# Patient Record
Sex: MALE | Race: WHITE | NOT HISPANIC OR LATINO | Employment: OTHER | URBAN - METROPOLITAN AREA
[De-identification: names, ages, dates, MRNs, and addresses within clinical notes are randomized per-mention and may not be internally consistent; named-entity substitution may affect disease eponyms.]

---

## 2023-05-05 LAB
ANION GAP IN SER/PLAS: 10 MMOL/L (ref 10–20)
CALCIUM (MG/DL) IN SER/PLAS: 10 MG/DL (ref 8.6–10.3)
CARBON DIOXIDE, TOTAL (MMOL/L) IN SER/PLAS: 30 MMOL/L (ref 21–32)
CHLORIDE (MMOL/L) IN SER/PLAS: 104 MMOL/L (ref 98–107)
CREATININE (MG/DL) IN SER/PLAS: 1.23 MG/DL (ref 0.5–1.3)
GFR MALE: 61 ML/MIN/1.73M2
GLUCOSE (MG/DL) IN SER/PLAS: 100 MG/DL (ref 74–99)
POTASSIUM (MMOL/L) IN SER/PLAS: 4 MMOL/L (ref 3.5–5.3)
SODIUM (MMOL/L) IN SER/PLAS: 140 MMOL/L (ref 136–145)
UREA NITROGEN (MG/DL) IN SER/PLAS: 16 MG/DL (ref 6–23)

## 2023-06-12 ENCOUNTER — HOSPITAL ENCOUNTER (OUTPATIENT)
Dept: DATA CONVERSION | Facility: HOSPITAL | Age: 78
End: 2023-06-12
Attending: STUDENT IN AN ORGANIZED HEALTH CARE EDUCATION/TRAINING PROGRAM
Payer: MEDICARE

## 2023-06-12 DIAGNOSIS — I70.213 ATHEROSCLEROSIS OF NATIVE ARTERIES OF EXTREMITIES WITH INTERMITTENT CLAUDICATION, BILATERAL LEGS (CMS-HCC): ICD-10-CM

## 2023-06-12 DIAGNOSIS — I70.211 ATHEROSCLEROSIS OF NATIVE ARTERIES OF EXTREMITIES WITH INTERMITTENT CLAUDICATION, RIGHT LEG (CMS-HCC): ICD-10-CM

## 2023-06-12 LAB
ACTIVATED PARTIAL THROMBOPLASTIN TIME IN PPP BY COAGULATION ASSAY: 30 SEC (ref 26–39)
ANION GAP IN SER/PLAS: 12 MMOL/L (ref 10–20)
CALCIUM (MG/DL) IN SER/PLAS: 10.2 MG/DL (ref 8.6–10.3)
CARBON DIOXIDE, TOTAL (MMOL/L) IN SER/PLAS: 27 MMOL/L (ref 21–32)
CHLORIDE (MMOL/L) IN SER/PLAS: 102 MMOL/L (ref 98–107)
CREATININE (MG/DL) IN SER/PLAS: 1.27 MG/DL (ref 0.5–1.3)
ERYTHROCYTE DISTRIBUTION WIDTH (RATIO) BY AUTOMATED COUNT: 12.4 % (ref 11.5–14.5)
ERYTHROCYTE MEAN CORPUSCULAR HEMOGLOBIN CONCENTRATION (G/DL) BY AUTOMATED: 33.4 G/DL (ref 32–36)
ERYTHROCYTE MEAN CORPUSCULAR VOLUME (FL) BY AUTOMATED COUNT: 97 FL (ref 80–100)
ERYTHROCYTES (10*6/UL) IN BLOOD BY AUTOMATED COUNT: 3.96 X10E12/L (ref 4.5–5.9)
GFR MALE: 58 ML/MIN/1.73M2
GLUCOSE (MG/DL) IN SER/PLAS: 108 MG/DL (ref 74–99)
HEMATOCRIT (%) IN BLOOD BY AUTOMATED COUNT: 38.3 % (ref 41–52)
HEMOGLOBIN (G/DL) IN BLOOD: 12.8 G/DL (ref 13.5–17.5)
INR IN PPP BY COAGULATION ASSAY: 1.1 (ref 0.9–1.1)
LEUKOCYTES (10*3/UL) IN BLOOD BY AUTOMATED COUNT: 7.3 X10E9/L (ref 4.4–11.3)
NRBC (PER 100 WBCS) BY AUTOMATED COUNT: 0 /100 WBC (ref 0–0)
PLATELETS (10*3/UL) IN BLOOD AUTOMATED COUNT: 183 X10E9/L (ref 150–450)
POTASSIUM (MMOL/L) IN SER/PLAS: 3.7 MMOL/L (ref 3.5–5.3)
PROTHROMBIN TIME (PT) IN PPP BY COAGULATION ASSAY: 12.5 SEC (ref 9.8–13.4)
SODIUM (MMOL/L) IN SER/PLAS: 137 MMOL/L (ref 136–145)
UREA NITROGEN (MG/DL) IN SER/PLAS: 19 MG/DL (ref 6–23)

## 2023-09-02 PROBLEM — I25.118 CORONARY ARTERY DISEASE WITH EXERTIONAL ANGINA (CMS-HCC): Status: ACTIVE | Noted: 2023-09-02

## 2023-09-02 PROBLEM — I10 HTN (HYPERTENSION): Status: ACTIVE | Noted: 2023-09-02

## 2023-09-02 PROBLEM — I70.213 INTERMITTENT CLAUDICATION OF BOTH LOWER EXTREMITIES DUE TO ATHEROSCLEROSIS (CMS-HCC): Status: ACTIVE | Noted: 2023-09-02

## 2023-09-02 PROBLEM — Z95.1 H/O FOUR VESSEL CORONARY ARTERY BYPASS GRAFT: Status: ACTIVE | Noted: 2023-09-02

## 2023-09-02 PROBLEM — E78.5 HYPERLIPIDEMIA: Status: ACTIVE | Noted: 2023-09-02

## 2023-09-02 PROBLEM — I20.89 CHRONIC STABLE ANGINA (CMS-HCC): Status: ACTIVE | Noted: 2023-09-02

## 2023-09-02 PROBLEM — I35.0 MODERATE AORTIC STENOSIS: Status: ACTIVE | Noted: 2023-09-02

## 2023-09-02 RX ORDER — AMLODIPINE BESYLATE 10 MG/1
1 TABLET ORAL DAILY
COMMUNITY
End: 2024-05-01 | Stop reason: DRUGHIGH

## 2023-09-02 RX ORDER — LOSARTAN POTASSIUM AND HYDROCHLOROTHIAZIDE 12.5; 1 MG/1; MG/1
1 TABLET ORAL DAILY
COMMUNITY
End: 2024-03-13

## 2023-09-02 RX ORDER — AMLODIPINE BESYLATE 5 MG/1
TABLET ORAL
COMMUNITY
Start: 2023-04-26 | End: 2024-03-13

## 2023-09-02 RX ORDER — METOPROLOL SUCCINATE 25 MG/1
0.5 TABLET, EXTENDED RELEASE ORAL DAILY
COMMUNITY
Start: 2021-04-06 | End: 2024-03-13

## 2023-09-02 RX ORDER — PRASUGREL 10 MG/1
1 TABLET, FILM COATED ORAL DAILY
COMMUNITY
End: 2024-03-13

## 2023-09-02 RX ORDER — NITROGLYCERIN 0.4 MG/1
0.4 TABLET SUBLINGUAL EVERY 5 MIN PRN
COMMUNITY

## 2023-09-02 RX ORDER — ATORVASTATIN CALCIUM 40 MG/1
1 TABLET, FILM COATED ORAL DAILY
COMMUNITY
End: 2024-03-13

## 2023-09-03 PROBLEM — I73.9 PAD (PERIPHERAL ARTERY DISEASE) (CMS-HCC): Status: ACTIVE | Noted: 2023-09-03

## 2023-09-03 RX ORDER — ASPIRIN 81 MG/1
81 TABLET ORAL DAILY
COMMUNITY

## 2023-09-30 NOTE — H&P
History & Physical Reviewed:   I have reviewed the History and Physical dated:  17-May-2300   History and Physical reviewed and relevant findings noted. Patient examined to review pertinent physical  findings.: No significant changes   Home Medications Reviewed: no changes noted   Allergies Reviewed: no changes noted       Airway/Sedation Assessment:  ·  Emotional Status calm   ·  Neurologic alert & oriented x 3   ·  Respiratory clear to auscultation   ·  Cardiovascular rhythm & rate regular   ·  GI/ soft, nontender     · Pulses present: Pedal Left, Pedal Right     ·  Mouth Opening OK yes   ·  Neck Flexibility OK yes   ·  Loose Teeth no   ·  Oropharyngeal Classification Class II   ·  ASA PS Classification ASA II   ·  Sedation Plan moderate sedation       ERAS (Enhanced Recovery After Surgery):  ·  ERAS Patient: no     Consent:   COVID-19 Consent:  ·  COVID-19 Risk Consent Surgeon has reviewed key risks related to the risk of armando COVID-19 and if they contract COVID-19 what the risks are.       Electronic Signatures:  Nomi Acosta)  (Signed 12-Jun-2023 13:56)   Authored: History & Physical Reviewed, Airway/Sedation,  ERAS, Consent, Note Completion      Last Updated: 12-Jun-2023 13:56 by Nomi Acosta)

## 2023-10-06 ENCOUNTER — APPOINTMENT (OUTPATIENT)
Dept: VASCULAR MEDICINE | Facility: HOSPITAL | Age: 78
End: 2023-10-06
Payer: MEDICARE

## 2023-10-06 ENCOUNTER — HOSPITAL ENCOUNTER (OUTPATIENT)
Dept: VASCULAR MEDICINE | Facility: HOSPITAL | Age: 78
Discharge: HOME | End: 2023-10-06
Payer: MEDICARE

## 2023-10-06 DIAGNOSIS — I73.9 PERIPHERAL VASCULAR DISEASE, UNSPECIFIED (CMS-HCC): ICD-10-CM

## 2023-10-06 DIAGNOSIS — I70.213 ATHEROSCLEROSIS OF NATIVE ARTERIES OF EXTREMITIES WITH INTERMITTENT CLAUDICATION, BILATERAL LEGS (CMS-HCC): ICD-10-CM

## 2023-10-06 DIAGNOSIS — Z09 ENCOUNTER FOR FOLLOW-UP EXAMINATION AFTER COMPLETED TREATMENT FOR CONDITIONS OTHER THAN MALIGNANT NEOPLASM: ICD-10-CM

## 2023-10-06 PROCEDURE — 93922 UPR/L XTREMITY ART 2 LEVELS: CPT

## 2023-10-06 PROCEDURE — 93922 UPR/L XTREMITY ART 2 LEVELS: CPT | Performed by: INTERNAL MEDICINE

## 2023-10-10 ENCOUNTER — APPOINTMENT (OUTPATIENT)
Dept: CARDIOLOGY | Facility: CLINIC | Age: 78
End: 2023-10-10
Payer: MEDICARE

## 2023-10-10 ENCOUNTER — APPOINTMENT (OUTPATIENT)
Dept: VASCULAR MEDICINE | Facility: CLINIC | Age: 78
End: 2023-10-10
Payer: MEDICARE

## 2023-10-17 ENCOUNTER — APPOINTMENT (OUTPATIENT)
Dept: VASCULAR SURGERY | Facility: CLINIC | Age: 78
End: 2023-10-17
Payer: MEDICARE

## 2023-10-24 ENCOUNTER — APPOINTMENT (OUTPATIENT)
Dept: VASCULAR SURGERY | Facility: CLINIC | Age: 78
End: 2023-10-24
Payer: MEDICARE

## 2024-03-13 DIAGNOSIS — I25.118 CORONARY ARTERY DISEASE WITH EXERTIONAL ANGINA (CMS-HCC): Primary | ICD-10-CM

## 2024-03-13 DIAGNOSIS — I10 PRIMARY HYPERTENSION: ICD-10-CM

## 2024-03-13 DIAGNOSIS — E78.5 HYPERLIPIDEMIA, UNSPECIFIED HYPERLIPIDEMIA TYPE: ICD-10-CM

## 2024-03-13 RX ORDER — AMLODIPINE BESYLATE 5 MG/1
5 TABLET ORAL DAILY
Qty: 90 TABLET | Refills: 3 | Status: SHIPPED | OUTPATIENT
Start: 2024-03-13 | End: 2024-05-01 | Stop reason: SDUPTHER

## 2024-03-13 RX ORDER — METOPROLOL SUCCINATE 25 MG/1
TABLET, EXTENDED RELEASE ORAL
Qty: 45 TABLET | Refills: 3 | Status: SHIPPED | OUTPATIENT
Start: 2024-03-13 | End: 2024-05-01 | Stop reason: SDUPTHER

## 2024-03-13 RX ORDER — ATORVASTATIN CALCIUM 40 MG/1
40 TABLET, FILM COATED ORAL DAILY
Qty: 90 TABLET | Refills: 3 | Status: SHIPPED | OUTPATIENT
Start: 2024-03-13 | End: 2024-05-01 | Stop reason: SDUPTHER

## 2024-03-13 RX ORDER — LOSARTAN POTASSIUM AND HYDROCHLOROTHIAZIDE 12.5; 1 MG/1; MG/1
1 TABLET ORAL DAILY
Qty: 90 TABLET | Refills: 3 | Status: SHIPPED | OUTPATIENT
Start: 2024-03-13 | End: 2024-05-01 | Stop reason: SDUPTHER

## 2024-03-13 RX ORDER — PRASUGREL 10 MG/1
10 TABLET, FILM COATED ORAL DAILY
Qty: 90 TABLET | Refills: 3 | Status: SHIPPED | OUTPATIENT
Start: 2024-03-13 | End: 2024-05-01 | Stop reason: SDUPTHER

## 2024-05-01 ENCOUNTER — HOSPITAL ENCOUNTER (OUTPATIENT)
Dept: CARDIOLOGY | Facility: CLINIC | Age: 79
Discharge: HOME | End: 2024-05-01
Payer: MEDICARE

## 2024-05-01 ENCOUNTER — OFFICE VISIT (OUTPATIENT)
Dept: CARDIOLOGY | Facility: CLINIC | Age: 79
End: 2024-05-01
Payer: MEDICARE

## 2024-05-01 VITALS
WEIGHT: 155 LBS | OXYGEN SATURATION: 97 % | SYSTOLIC BLOOD PRESSURE: 111 MMHG | HEART RATE: 65 BPM | BODY MASS INDEX: 25.02 KG/M2 | DIASTOLIC BLOOD PRESSURE: 61 MMHG

## 2024-05-01 DIAGNOSIS — I35.0 MODERATE AORTIC STENOSIS: Primary | ICD-10-CM

## 2024-05-01 DIAGNOSIS — I35.0 MODERATE AORTIC STENOSIS: ICD-10-CM

## 2024-05-01 DIAGNOSIS — Z01.810 PREOPERATIVE CARDIOVASCULAR EXAMINATION: ICD-10-CM

## 2024-05-01 DIAGNOSIS — I25.118 CORONARY ARTERY DISEASE WITH EXERTIONAL ANGINA (CMS-HCC): ICD-10-CM

## 2024-05-01 DIAGNOSIS — Z95.1 H/O FOUR VESSEL CORONARY ARTERY BYPASS GRAFT: ICD-10-CM

## 2024-05-01 DIAGNOSIS — E78.5 HYPERLIPIDEMIA, UNSPECIFIED HYPERLIPIDEMIA TYPE: ICD-10-CM

## 2024-05-01 DIAGNOSIS — I10 PRIMARY HYPERTENSION: ICD-10-CM

## 2024-05-01 DIAGNOSIS — I70.213 INTERMITTENT CLAUDICATION OF BOTH LOWER EXTREMITIES DUE TO ATHEROSCLEROSIS (CMS-HCC): ICD-10-CM

## 2024-05-01 PROBLEM — I20.89 CHRONIC STABLE ANGINA (CMS-HCC): Status: RESOLVED | Noted: 2023-09-02 | Resolved: 2024-05-01

## 2024-05-01 PROCEDURE — 93306 TTE W/DOPPLER COMPLETE: CPT | Performed by: INTERNAL MEDICINE

## 2024-05-01 PROCEDURE — 1036F TOBACCO NON-USER: CPT | Performed by: INTERNAL MEDICINE

## 2024-05-01 PROCEDURE — 93000 ELECTROCARDIOGRAM COMPLETE: CPT | Performed by: INTERNAL MEDICINE

## 2024-05-01 PROCEDURE — 3074F SYST BP LT 130 MM HG: CPT | Performed by: INTERNAL MEDICINE

## 2024-05-01 PROCEDURE — 1159F MED LIST DOCD IN RCRD: CPT | Performed by: INTERNAL MEDICINE

## 2024-05-01 PROCEDURE — 93306 TTE W/DOPPLER COMPLETE: CPT

## 2024-05-01 PROCEDURE — 1157F ADVNC CARE PLAN IN RCRD: CPT | Performed by: INTERNAL MEDICINE

## 2024-05-01 PROCEDURE — 3078F DIAST BP <80 MM HG: CPT | Performed by: INTERNAL MEDICINE

## 2024-05-01 PROCEDURE — 99214 OFFICE O/P EST MOD 30 MIN: CPT | Performed by: INTERNAL MEDICINE

## 2024-05-01 RX ORDER — METOPROLOL SUCCINATE 25 MG/1
TABLET, EXTENDED RELEASE ORAL
Qty: 45 TABLET | Refills: 3 | Status: SHIPPED | OUTPATIENT
Start: 2024-05-01 | End: 2024-05-03 | Stop reason: SDUPTHER

## 2024-05-01 RX ORDER — ATORVASTATIN CALCIUM 40 MG/1
40 TABLET, FILM COATED ORAL DAILY
Qty: 90 TABLET | Refills: 3 | Status: SHIPPED | OUTPATIENT
Start: 2024-05-01 | End: 2024-05-03 | Stop reason: SDUPTHER

## 2024-05-01 RX ORDER — AMLODIPINE BESYLATE 5 MG/1
5 TABLET ORAL DAILY
Qty: 90 TABLET | Refills: 3 | Status: SHIPPED | OUTPATIENT
Start: 2024-05-01 | End: 2024-05-03 | Stop reason: SDUPTHER

## 2024-05-01 RX ORDER — PRASUGREL 10 MG/1
10 TABLET, FILM COATED ORAL DAILY
Qty: 90 TABLET | Refills: 3 | Status: SHIPPED | OUTPATIENT
Start: 2024-05-01 | End: 2024-05-03 | Stop reason: SDUPTHER

## 2024-05-01 RX ORDER — LOSARTAN POTASSIUM AND HYDROCHLOROTHIAZIDE 12.5; 1 MG/1; MG/1
1 TABLET ORAL DAILY
Qty: 90 TABLET | Refills: 3 | Status: SHIPPED | OUTPATIENT
Start: 2024-05-01 | End: 2024-05-03 | Stop reason: SDUPTHER

## 2024-05-01 ASSESSMENT — ENCOUNTER SYMPTOMS
DYSPNEA ON EXERTION: 1
CLAUDICATION: 1

## 2024-05-01 NOTE — PROGRESS NOTES
Subjective   Anton Miranda is a 78 y.o. male.    Chief Complaint:  Aortic stenosis.  Preoperative evaluation prior to vascular surgery.  Claudication.  Fatigue.  Exertional dyspnea.    HPI    He is here for preoperative evaluation prior to vascular surgery.  He has significant claudication symptoms in his right lower extremity.  Prior vascular studies have demonstrated severe diffuse disease in the right femoral and superficial femoral artery.  He notices more fatigue and generalized weakness.  Denies anginal symptoms.  No syncope or near syncope.  He spends the winter in [place].  He tried to do a lot of walking in [place] about was limited because of claudication.    He presented on May 4, 2022 with severe hypertension. This appeared to be triggered by an anxiety attack. After initiation of Xanax therapy his blood pressures normalized.     Cardiac catheterization on 2021 showed an occluded left main. There was a patent left internal mammary graft to the anterior descending. There was a patent vein graft to the diagonal. There was a tight stenosis in the vein graft to the obtuse marginal branch. This was treated with stent placement. The right coronary artery was widely patent with minimal disease.     He underwent coronary artery bypass grafting times 4 in  at [facility]. In  he had stents placed and subsequently developed restenosis along with left main coronary artery disease. It is of interest to note that despite his 80% left main stenosis he had mild symptoms.      He has a past history of hypertension. He had a brief smoking history but quit in . His father had a myocardial infarction and  at age 66. He does have hyperlipidemia.     Is otherwise in good health with no major medical problems.      Allergies  Medication    · No Known Drug Allergies   Recorded By: Bela Collins; 2019 1:52:40 PM     Family History  Mother    · Family history of coronary artery disease (V17.3)  (Z82.49)   · Family history of myocardial infarction (V17.3) (Z82.49)     Social History  Problems    · Does not use illicit drugs (V49.89) (Z78.9)   · Former smoker (V15.82) (Z87.891)   · quit 1980   · Moderate alcohol use    Review of Systems   Constitutional: Positive for malaise/fatigue.   Cardiovascular:  Positive for claudication and dyspnea on exertion.   Musculoskeletal:  Positive for arthritis.       Visit Vitals  /61 (BP Location: Right arm, Patient Position: Sitting, BP Cuff Size: Adult)   Pulse 65   Wt 70.3 kg (155 lb)   SpO2 97%   BMI 25.02 kg/m²   Smoking Status Former   BSA 1.81 m²        Objective     Constitutional:       Appearance: Not in distress.   Neck:      Vascular: JVD normal.   Pulmonary:      Breath sounds: Normal breath sounds.   Cardiovascular:      Normal rate. Regular rhythm. S1 with normal intensity. S2 with normal intensity.       Murmurs: There is a grade 3/6 systolic murmur.      No gallop.    Pulses:     Intact distal pulses.   Edema:     Peripheral edema absent.   Abdominal:      General: Bowel sounds are normal.   Neurological:      Mental Status: Alert and oriented to person, place and time.         Lab Review:   Lab Results   Component Value Date     06/12/2023    K 3.7 06/12/2023     06/12/2023    CO2 27 06/12/2023    BUN 19 06/12/2023    CREATININE 1.27 06/12/2023    GLUCOSE 108 (H) 06/12/2023    CALCIUM 10.2 06/12/2023     Lab Results   Component Value Date    CHOL 167 04/29/2022    TRIG 174 (H) 04/29/2022    HDL 58.0 04/29/2022       Assessment:    1.  Coronary artery disease.  Status post coronary bypass grafting in 2000.  Latest cardiac catheterization in 2021 showed a patent left internal mammary graft to the anterior descending, patent vein graft to the diagonal, and a vein graft to the obtuse marginal branch of the circumflex coronary artery.  His right coronary artery had mild disease.  He has had no anginal symptoms.  Continue medical management.   Today's echocardiographic study shows no wall motion abnormalities with a normal left ventricular ejection fraction.    2.  Aortic stenosis.  Today's echocardiographic study shows moderate aortic stenosis.  The valve appears trileaflet.  The parameters are unchanged from his previous echo 1 year ago.  No indication for intervention on his aortic valve at this time.    3.  Claudication.  Followed by the vascular surgery service.    4.  Hyperlipidemia.  Most recent LDL is 74.    5.  Preoperative evaluation.  Cleared for the operative procedure with acceptable cardiac risk.  Anticoagulation can be stopped 7 days prior to the procedure.

## 2024-05-01 NOTE — PATIENT INSTRUCTIONS
Your echocardiographic study shows no change in the aortic valve.  It is moderately narrowed.    We are going to give the okay for your surgical procedure.

## 2024-05-02 LAB
AORTIC VALVE MEAN GRADIENT: 22.2 MMHG
AORTIC VALVE PEAK VELOCITY: 3.26 M/S
AV PEAK GRADIENT: 42.6 MMHG
AVA (PEAK VEL): 0.87 CM2
AVA (VTI): 0.88 CM2
EJECTION FRACTION APICAL 4 CHAMBER: 48.3
LEFT VENTRICLE INTERNAL DIMENSION DIASTOLE: 4.79 CM (ref 3.5–6)
LEFT VENTRICULAR OUTFLOW TRACT DIAMETER: 1.98 CM
LV EJECTION FRACTION BIPLANE: 51 %
RIGHT VENTRICLE FREE WALL PEAK S': 0.11 CM/S
RIGHT VENTRICLE PEAK SYSTOLIC PRESSURE: 18 MMHG
TRICUSPID ANNULAR PLANE SYSTOLIC EXCURSION: 2.2 CM

## 2024-05-03 ENCOUNTER — TELEPHONE (OUTPATIENT)
Dept: CARDIOLOGY | Facility: CLINIC | Age: 79
End: 2024-05-03
Payer: MEDICARE

## 2024-05-03 DIAGNOSIS — E78.5 HYPERLIPIDEMIA, UNSPECIFIED HYPERLIPIDEMIA TYPE: ICD-10-CM

## 2024-05-03 DIAGNOSIS — I25.118 CORONARY ARTERY DISEASE WITH EXERTIONAL ANGINA (CMS-HCC): ICD-10-CM

## 2024-05-03 DIAGNOSIS — I10 PRIMARY HYPERTENSION: ICD-10-CM

## 2024-05-03 RX ORDER — METOPROLOL SUCCINATE 25 MG/1
TABLET, EXTENDED RELEASE ORAL
Qty: 45 TABLET | Refills: 3 | Status: SHIPPED | OUTPATIENT
Start: 2024-05-03

## 2024-05-03 RX ORDER — LOSARTAN POTASSIUM AND HYDROCHLOROTHIAZIDE 12.5; 1 MG/1; MG/1
1 TABLET ORAL DAILY
Qty: 90 TABLET | Refills: 3 | Status: SHIPPED | OUTPATIENT
Start: 2024-05-03

## 2024-05-03 RX ORDER — PRASUGREL 10 MG/1
10 TABLET, FILM COATED ORAL DAILY
Qty: 90 TABLET | Refills: 3 | Status: SHIPPED | OUTPATIENT
Start: 2024-05-03

## 2024-05-03 RX ORDER — ATORVASTATIN CALCIUM 40 MG/1
40 TABLET, FILM COATED ORAL DAILY
Qty: 90 TABLET | Refills: 3 | Status: SHIPPED | OUTPATIENT
Start: 2024-05-03

## 2024-05-03 RX ORDER — AMLODIPINE BESYLATE 5 MG/1
5 TABLET ORAL DAILY
Qty: 90 TABLET | Refills: 3 | Status: SHIPPED | OUTPATIENT
Start: 2024-05-03

## 2024-05-03 NOTE — TELEPHONE ENCOUNTER
Patient stopped into the office today because his prescriptions were sent to [facility] instead of [facility].  Please resubmit his medications to [facility] and call with any questions or concerns, thank you.

## 2024-05-06 ENCOUNTER — LAB (OUTPATIENT)
Dept: LAB | Facility: LAB | Age: 79
End: 2024-05-06
Payer: MEDICARE

## 2024-05-06 DIAGNOSIS — I10 PRIMARY HYPERTENSION: ICD-10-CM

## 2024-05-06 DIAGNOSIS — I70.213 INTERMITTENT CLAUDICATION OF BOTH LOWER EXTREMITIES DUE TO ATHEROSCLEROSIS (CMS-HCC): ICD-10-CM

## 2024-05-06 DIAGNOSIS — I35.0 MODERATE AORTIC STENOSIS: ICD-10-CM

## 2024-05-06 DIAGNOSIS — I25.118 CORONARY ARTERY DISEASE WITH EXERTIONAL ANGINA (CMS-HCC): ICD-10-CM

## 2024-05-06 DIAGNOSIS — E78.5 HYPERLIPIDEMIA, UNSPECIFIED HYPERLIPIDEMIA TYPE: ICD-10-CM

## 2024-05-06 LAB
ALBUMIN SERPL BCP-MCNC: 3.9 G/DL (ref 3.4–5)
ALP SERPL-CCNC: 50 U/L (ref 33–136)
ALT SERPL W P-5'-P-CCNC: 18 U/L (ref 10–52)
ANION GAP SERPL CALC-SCNC: 8 MMOL/L (ref 10–20)
AST SERPL W P-5'-P-CCNC: 21 U/L (ref 9–39)
BILIRUB SERPL-MCNC: 0.7 MG/DL (ref 0–1.2)
BUN SERPL-MCNC: 15 MG/DL (ref 6–23)
CALCIUM SERPL-MCNC: 9.4 MG/DL (ref 8.6–10.3)
CHLORIDE SERPL-SCNC: 106 MMOL/L (ref 98–107)
CHOLEST SERPL-MCNC: 147 MG/DL (ref 0–199)
CHOLESTEROL/HDL RATIO: 2.6
CO2 SERPL-SCNC: 30 MMOL/L (ref 21–32)
CREAT SERPL-MCNC: 1.15 MG/DL (ref 0.5–1.3)
EGFRCR SERPLBLD CKD-EPI 2021: 66 ML/MIN/1.73M*2
ERYTHROCYTE [DISTWIDTH] IN BLOOD BY AUTOMATED COUNT: 12.8 % (ref 11.5–14.5)
GLUCOSE SERPL-MCNC: 98 MG/DL (ref 74–99)
HCT VFR BLD AUTO: 37.1 % (ref 41–52)
HDLC SERPL-MCNC: 55.7 MG/DL
HGB BLD-MCNC: 12.8 G/DL (ref 13.5–17.5)
LDLC SERPL CALC-MCNC: 74 MG/DL
MCH RBC QN AUTO: 32.7 PG (ref 26–34)
MCHC RBC AUTO-ENTMCNC: 34.5 G/DL (ref 32–36)
MCV RBC AUTO: 95 FL (ref 80–100)
NON HDL CHOLESTEROL: 91 MG/DL (ref 0–149)
NRBC BLD-RTO: 0 /100 WBCS (ref 0–0)
PLATELET # BLD AUTO: 175 X10*3/UL (ref 150–450)
POTASSIUM SERPL-SCNC: 4.1 MMOL/L (ref 3.5–5.3)
PROT SERPL-MCNC: 6.6 G/DL (ref 6.4–8.2)
RBC # BLD AUTO: 3.91 X10*6/UL (ref 4.5–5.9)
SODIUM SERPL-SCNC: 140 MMOL/L (ref 136–145)
TRIGL SERPL-MCNC: 89 MG/DL (ref 0–149)
VLDL: 18 MG/DL (ref 0–40)
WBC # BLD AUTO: 5.1 X10*3/UL (ref 4.4–11.3)

## 2024-05-06 PROCEDURE — 80061 LIPID PANEL: CPT

## 2024-05-06 PROCEDURE — 36415 COLL VENOUS BLD VENIPUNCTURE: CPT

## 2024-05-06 PROCEDURE — 80053 COMPREHEN METABOLIC PANEL: CPT

## 2024-05-06 PROCEDURE — 85027 COMPLETE CBC AUTOMATED: CPT

## 2024-05-07 ENCOUNTER — OFFICE VISIT (OUTPATIENT)
Dept: VASCULAR SURGERY | Facility: CLINIC | Age: 79
End: 2024-05-07
Payer: MEDICARE

## 2024-05-07 VITALS
HEART RATE: 64 BPM | DIASTOLIC BLOOD PRESSURE: 64 MMHG | BODY MASS INDEX: 25.07 KG/M2 | SYSTOLIC BLOOD PRESSURE: 122 MMHG | HEIGHT: 66 IN | OXYGEN SATURATION: 96 % | WEIGHT: 156 LBS

## 2024-05-07 DIAGNOSIS — I99.8 OTHER DISORDER OF CIRCULATORY SYSTEM: ICD-10-CM

## 2024-05-07 DIAGNOSIS — I73.9 PAD (PERIPHERAL ARTERY DISEASE) (CMS-HCC): Primary | ICD-10-CM

## 2024-05-07 DIAGNOSIS — I70.211 INTERMITTENT CLAUDICATION OF RIGHT LOWER EXTREMITY DUE TO ATHEROSCLEROSIS (CMS-HCC): ICD-10-CM

## 2024-05-07 PROCEDURE — 1157F ADVNC CARE PLAN IN RCRD: CPT | Performed by: SURGERY

## 2024-05-07 PROCEDURE — 1159F MED LIST DOCD IN RCRD: CPT | Performed by: SURGERY

## 2024-05-07 PROCEDURE — 99213 OFFICE O/P EST LOW 20 MIN: CPT | Performed by: SURGERY

## 2024-05-07 PROCEDURE — 3074F SYST BP LT 130 MM HG: CPT | Performed by: SURGERY

## 2024-05-07 PROCEDURE — 1160F RVW MEDS BY RX/DR IN RCRD: CPT | Performed by: SURGERY

## 2024-05-07 PROCEDURE — 1036F TOBACCO NON-USER: CPT | Performed by: SURGERY

## 2024-05-07 PROCEDURE — 3078F DIAST BP <80 MM HG: CPT | Performed by: SURGERY

## 2024-05-07 RX ORDER — SODIUM CHLORIDE 9 MG/ML
100 INJECTION, SOLUTION INTRAVENOUS CONTINUOUS
Status: CANCELLED | OUTPATIENT
Start: 2024-05-07

## 2024-05-07 ASSESSMENT — ENCOUNTER SYMPTOMS
CARDIOVASCULAR NEGATIVE: 1
HEMATOLOGIC/LYMPHATIC NEGATIVE: 1
ALLERGIC/IMMUNOLOGIC NEGATIVE: 1
NEUROLOGICAL NEGATIVE: 1
GASTROINTESTINAL NEGATIVE: 1
PSYCHIATRIC NEGATIVE: 1
CONSTITUTIONAL NEGATIVE: 1
MUSCULOSKELETAL NEGATIVE: 1
RESPIRATORY NEGATIVE: 1
ENDOCRINE NEGATIVE: 1

## 2024-05-07 NOTE — PROGRESS NOTES
History Of Present Illness  Anton Miranda is a 78 y.o. male presenting for PAD follow-up.  He states that his right leg claudication symptoms at rest since his last visit.  He feels that is now beginning to limit his daily activities.  The last time he was seen surgical discussion was had regarding right femoral endarterectomy with SFA intervention.  He felt his symptoms were tolerable at this time.  However since his symptoms have progressed he is again interested in having surgery he denies any rest pain.  He denies any sores or ulcers.     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    Surgical History  He has a past surgical history that includes Other surgical history (02/06/2019); Other surgical history (02/06/2019); Other surgical history (02/06/2019); Other surgical history (05/17/2021); and CT angio aorta and bilateral iliofemoral runoff w and or wo IV contrast (5/8/2023).     Social History  He reports that he has quit smoking. His smoking use included cigarettes. He has never used smokeless tobacco. No history on file for alcohol use and drug use.    Family History  Family History   Problem Relation Name Age of Onset    Coronary artery disease Mother      Heart attack Mother          Allergies  Patient has no known allergies.    Review of Systems   Constitutional: Negative.    HENT: Negative.     Respiratory: Negative.     Cardiovascular: Negative.    Gastrointestinal: Negative.    Endocrine: Negative.   "  Genitourinary: Negative.    Musculoskeletal: Negative.    Allergic/Immunologic: Negative.    Neurological: Negative.    Hematological: Negative.    Psychiatric/Behavioral: Negative.          Physical Exam  Vitals reviewed.   Constitutional:       General: He is not in acute distress.     Appearance: Normal appearance. He is normal weight.   HENT:      Head: Normocephalic and atraumatic.   Eyes:      Extraocular Movements: Extraocular movements intact.      Conjunctiva/sclera: Conjunctivae normal.   Cardiovascular:      Rate and Rhythm: Normal rate and regular rhythm.      Pulses:           Femoral pulses are 2+ on the right side and 2+ on the left side.       Dorsalis pedis pulses are detected w/ Doppler on the right side and 1+ on the left side.        Posterior tibial pulses are detected w/ Doppler on the right side and 1+ on the left side.      Heart sounds: Normal heart sounds.   Pulmonary:      Effort: Pulmonary effort is normal.      Breath sounds: Normal breath sounds.   Abdominal:      General: Abdomen is flat.      Palpations: Abdomen is soft.   Musculoskeletal:         General: No swelling or tenderness. Normal range of motion.      Cervical back: Normal range of motion.      Right lower leg: No edema.      Left lower leg: No edema.   Skin:     General: Skin is warm and dry.      Capillary Refill: Capillary refill takes less than 2 seconds.   Neurological:      General: No focal deficit present.      Mental Status: He is alert and oriented to person, place, and time.      Cranial Nerves: No cranial nerve deficit.      Sensory: No sensory deficit.      Motor: No weakness.   Psychiatric:         Mood and Affect: Mood normal.         Behavior: Behavior normal.          Last Recorded Vitals  Blood pressure 122/64, pulse 64, height 1.676 m (5' 6\"), weight 70.8 kg (156 lb), SpO2 96%.    Relevant Results      Current Outpatient Medications:     amLODIPine (Norvasc) 5 mg tablet, Take 1 tablet (5 mg) by mouth " once daily., Disp: 90 tablet, Rfl: 3    aspirin 81 mg EC tablet, Take 1 tablet (81 mg) by mouth once daily., Disp: , Rfl:     atorvastatin (Lipitor) 40 mg tablet, Take 1 tablet (40 mg) by mouth once daily., Disp: 90 tablet, Rfl: 3    losartan-hydrochlorothiazide (Hyzaar) 100-12.5 mg tablet, Take 1 tablet by mouth once daily., Disp: 90 tablet, Rfl: 3    metoprolol succinate XL (Toprol-XL) 25 mg 24 hr tablet, Take 1/2 Tablet (12.5 mg) Daily, Disp: 45 tablet, Rfl: 3    nitroglycerin (Nitrostat) 0.4 mg SL tablet, Place 1 tablet (0.4 mg) under the tongue every 5 minutes if needed. Up to 3 doses as needed for chest pain, Disp: , Rfl:     prasugrel (Effient) 10 mg tablet, Take 1 tablet (10 mg) by mouth once daily., Disp: 90 tablet, Rfl: 3          Assessment/Plan   Diagnoses and all orders for this visit:  PAD (peripheral artery disease) (CMS-HCC)  -     Coagulation Screen; Future  -     Case Request Operating Room: Lower Extremity Angiogram; Standing  -     CBC; Future  -     Basic Metabolic Panel; Future  Intermittent claudication of right lower extremity due to atherosclerosis (CMS-HCC)  -     Coagulation Screen; Future  -     Case Request Operating Room: Lower Extremity Angiogram; Standing  -     CBC; Future  -     Basic Metabolic Panel; Future  Other disorder of circulatory system  -     Coagulation Screen; Future  Other orders  -     Vital Signs; Standing  -     Pulse oximetry, spot; Standing  -     Insert and maintain peripheral IV; Standing  -     Saline lock IV; Standing  -     sodium chloride 0.9% infusion  -     Place in outpatient/hospital ambulatory surgery; Standing  -     Full code; Standing      79yo male with PAD.  His claudication symptoms have worsened since his last visit 8 months ago.  We had previously discussed intervention with right femoral endarterectomy and SFA intervention.  However this was based on angiogram from almost a year ago.  Therefore I have recommended repeating an angiogram at this  time to evaluate for any progression of disease and determine the best intervention at this time.  I have discussed this with the patient and he would like to proceed.  Will schedule in [facility] with moderate sedation.       I spent 25 minutes in the professional and overall care of this patient.      Tali Lowry MD

## 2024-05-07 NOTE — H&P (VIEW-ONLY)
History Of Present Illness  Anton Miranda is a 78 y.o. male presenting for PAD follow-up.  He states that his right leg claudication symptoms at rest since his last visit.  He feels that is now beginning to limit his daily activities.  The last time he was seen surgical discussion was had regarding right femoral endarterectomy with SFA intervention.  He felt his symptoms were tolerable at this time.  However since his symptoms have progressed he is again interested in having surgery he denies any rest pain.  He denies any sores or ulcers.     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    Surgical History  He has a past surgical history that includes Other surgical history (02/06/2019); Other surgical history (02/06/2019); Other surgical history (02/06/2019); Other surgical history (05/17/2021); and CT angio aorta and bilateral iliofemoral runoff w and or wo IV contrast (5/8/2023).     Social History  He reports that he has quit smoking. His smoking use included cigarettes. He has never used smokeless tobacco. No history on file for alcohol use and drug use.    Family History  Family History   Problem Relation Name Age of Onset    Coronary artery disease Mother      Heart attack Mother          Allergies  Patient has no known allergies.    Review of Systems   Constitutional: Negative.    HENT: Negative.     Respiratory: Negative.     Cardiovascular: Negative.    Gastrointestinal: Negative.    Endocrine: Negative.   "  Genitourinary: Negative.    Musculoskeletal: Negative.    Allergic/Immunologic: Negative.    Neurological: Negative.    Hematological: Negative.    Psychiatric/Behavioral: Negative.          Physical Exam  Vitals reviewed.   Constitutional:       General: He is not in acute distress.     Appearance: Normal appearance. He is normal weight.   HENT:      Head: Normocephalic and atraumatic.   Eyes:      Extraocular Movements: Extraocular movements intact.      Conjunctiva/sclera: Conjunctivae normal.   Cardiovascular:      Rate and Rhythm: Normal rate and regular rhythm.      Pulses:           Femoral pulses are 2+ on the right side and 2+ on the left side.       Dorsalis pedis pulses are detected w/ Doppler on the right side and 1+ on the left side.        Posterior tibial pulses are detected w/ Doppler on the right side and 1+ on the left side.      Heart sounds: Normal heart sounds.   Pulmonary:      Effort: Pulmonary effort is normal.      Breath sounds: Normal breath sounds.   Abdominal:      General: Abdomen is flat.      Palpations: Abdomen is soft.   Musculoskeletal:         General: No swelling or tenderness. Normal range of motion.      Cervical back: Normal range of motion.      Right lower leg: No edema.      Left lower leg: No edema.   Skin:     General: Skin is warm and dry.      Capillary Refill: Capillary refill takes less than 2 seconds.   Neurological:      General: No focal deficit present.      Mental Status: He is alert and oriented to person, place, and time.      Cranial Nerves: No cranial nerve deficit.      Sensory: No sensory deficit.      Motor: No weakness.   Psychiatric:         Mood and Affect: Mood normal.         Behavior: Behavior normal.          Last Recorded Vitals  Blood pressure 122/64, pulse 64, height 1.676 m (5' 6\"), weight 70.8 kg (156 lb), SpO2 96%.    Relevant Results      Current Outpatient Medications:     amLODIPine (Norvasc) 5 mg tablet, Take 1 tablet (5 mg) by mouth " once daily., Disp: 90 tablet, Rfl: 3    aspirin 81 mg EC tablet, Take 1 tablet (81 mg) by mouth once daily., Disp: , Rfl:     atorvastatin (Lipitor) 40 mg tablet, Take 1 tablet (40 mg) by mouth once daily., Disp: 90 tablet, Rfl: 3    losartan-hydrochlorothiazide (Hyzaar) 100-12.5 mg tablet, Take 1 tablet by mouth once daily., Disp: 90 tablet, Rfl: 3    metoprolol succinate XL (Toprol-XL) 25 mg 24 hr tablet, Take 1/2 Tablet (12.5 mg) Daily, Disp: 45 tablet, Rfl: 3    nitroglycerin (Nitrostat) 0.4 mg SL tablet, Place 1 tablet (0.4 mg) under the tongue every 5 minutes if needed. Up to 3 doses as needed for chest pain, Disp: , Rfl:     prasugrel (Effient) 10 mg tablet, Take 1 tablet (10 mg) by mouth once daily., Disp: 90 tablet, Rfl: 3          Assessment/Plan   Diagnoses and all orders for this visit:  PAD (peripheral artery disease) (CMS-HCC)  -     Coagulation Screen; Future  -     Case Request Operating Room: Lower Extremity Angiogram; Standing  -     CBC; Future  -     Basic Metabolic Panel; Future  Intermittent claudication of right lower extremity due to atherosclerosis (CMS-HCC)  -     Coagulation Screen; Future  -     Case Request Operating Room: Lower Extremity Angiogram; Standing  -     CBC; Future  -     Basic Metabolic Panel; Future  Other disorder of circulatory system  -     Coagulation Screen; Future  Other orders  -     Vital Signs; Standing  -     Pulse oximetry, spot; Standing  -     Insert and maintain peripheral IV; Standing  -     Saline lock IV; Standing  -     sodium chloride 0.9% infusion  -     Place in outpatient/hospital ambulatory surgery; Standing  -     Full code; Standing      79yo male with PAD.  His claudication symptoms have worsened since his last visit 8 months ago.  We had previously discussed intervention with right femoral endarterectomy and SFA intervention.  However this was based on angiogram from almost a year ago.  Therefore I have recommended repeating an angiogram at this  time to evaluate for any progression of disease and determine the best intervention at this time.  I have discussed this with the patient and he would like to proceed.  Will schedule in [facility] with moderate sedation.       I spent 25 minutes in the professional and overall care of this patient.      Tali Lowry MD

## 2024-05-17 ENCOUNTER — HOSPITAL ENCOUNTER (OUTPATIENT)
Facility: HOSPITAL | Age: 79
Setting detail: OUTPATIENT SURGERY
Discharge: HOME | End: 2024-05-17
Attending: SURGERY | Admitting: SURGERY
Payer: MEDICARE

## 2024-05-17 VITALS
OXYGEN SATURATION: 98 % | DIASTOLIC BLOOD PRESSURE: 78 MMHG | HEART RATE: 71 BPM | TEMPERATURE: 96.4 F | WEIGHT: 154.54 LBS | RESPIRATION RATE: 15 BRPM | HEIGHT: 66 IN | SYSTOLIC BLOOD PRESSURE: 144 MMHG | BODY MASS INDEX: 24.84 KG/M2

## 2024-05-17 DIAGNOSIS — I73.9 PAD (PERIPHERAL ARTERY DISEASE) (CMS-HCC): Primary | ICD-10-CM

## 2024-05-17 DIAGNOSIS — Z01.818 PRE-OPERATIVE EXAM: ICD-10-CM

## 2024-05-17 DIAGNOSIS — I70.211 INTERMITTENT CLAUDICATION OF RIGHT LOWER EXTREMITY DUE TO ATHEROSCLEROSIS (CMS-HCC): ICD-10-CM

## 2024-05-17 DIAGNOSIS — I73.9 PAD (PERIPHERAL ARTERY DISEASE) (CMS-HCC): ICD-10-CM

## 2024-05-17 DIAGNOSIS — I70.211 ATHEROSCLEROSIS OF NATIVE ARTERY OF RIGHT LOWER EXTREMITY WITH INTERMITTENT CLAUDICATION (CMS-HCC): ICD-10-CM

## 2024-05-17 PROCEDURE — 75774 ARTERY X-RAY EACH VESSEL: CPT | Performed by: SURGERY

## 2024-05-17 PROCEDURE — 99152 MOD SED SAME PHYS/QHP 5/>YRS: CPT | Performed by: SURGERY

## 2024-05-17 PROCEDURE — 2780000003 HC OR 278 NO HCPCS: Performed by: SURGERY

## 2024-05-17 PROCEDURE — G0269 OCCLUSIVE DEVICE IN VEIN ART: HCPCS | Mod: TC | Performed by: SURGERY

## 2024-05-17 PROCEDURE — 2500000005 HC RX 250 GENERAL PHARMACY W/O HCPCS: Performed by: SURGERY

## 2024-05-17 PROCEDURE — 75630 X-RAY AORTA LEG ARTERIES: CPT | Performed by: SURGERY

## 2024-05-17 PROCEDURE — 2720000007 HC OR 272 NO HCPCS: Performed by: SURGERY

## 2024-05-17 PROCEDURE — 2550000001 HC RX 255 CONTRASTS: Performed by: SURGERY

## 2024-05-17 PROCEDURE — C1894 INTRO/SHEATH, NON-LASER: HCPCS | Performed by: SURGERY

## 2024-05-17 PROCEDURE — 7100000009 HC PHASE TWO TIME - INITIAL BASE CHARGE: Performed by: SURGERY

## 2024-05-17 PROCEDURE — 2500000004 HC RX 250 GENERAL PHARMACY W/ HCPCS (ALT 636 FOR OP/ED): Performed by: SURGERY

## 2024-05-17 PROCEDURE — 75625 CONTRAST EXAM ABDOMINL AORTA: CPT | Performed by: SURGERY

## 2024-05-17 PROCEDURE — 7100000010 HC PHASE TWO TIME - EACH INCREMENTAL 1 MINUTE: Performed by: SURGERY

## 2024-05-17 PROCEDURE — 99153 MOD SED SAME PHYS/QHP EA: CPT | Performed by: SURGERY

## 2024-05-17 PROCEDURE — 36200 PLACE CATHETER IN AORTA: CPT | Performed by: SURGERY

## 2024-05-17 PROCEDURE — 75710 ARTERY X-RAYS ARM/LEG: CPT | Performed by: SURGERY

## 2024-05-17 PROCEDURE — C1760 CLOSURE DEV, VASC: HCPCS | Performed by: SURGERY

## 2024-05-17 PROCEDURE — 36247 INS CATH ABD/L-EXT ART 3RD: CPT | Mod: RT

## 2024-05-17 PROCEDURE — 76937 US GUIDE VASCULAR ACCESS: CPT | Performed by: SURGERY

## 2024-05-17 RX ORDER — SODIUM CHLORIDE 9 MG/ML
100 INJECTION, SOLUTION INTRAVENOUS CONTINUOUS
Status: DISCONTINUED | OUTPATIENT
Start: 2024-05-17 | End: 2024-05-17 | Stop reason: HOSPADM

## 2024-05-17 RX ORDER — LIDOCAINE HYDROCHLORIDE 20 MG/ML
INJECTION, SOLUTION INFILTRATION; PERINEURAL AS NEEDED
Status: DISCONTINUED | OUTPATIENT
Start: 2024-05-17 | End: 2024-05-17 | Stop reason: HOSPADM

## 2024-05-17 RX ORDER — FENTANYL CITRATE 50 UG/ML
INJECTION, SOLUTION INTRAMUSCULAR; INTRAVENOUS AS NEEDED
Status: DISCONTINUED | OUTPATIENT
Start: 2024-05-17 | End: 2024-05-17 | Stop reason: HOSPADM

## 2024-05-17 RX ORDER — MIDAZOLAM HYDROCHLORIDE 1 MG/ML
INJECTION, SOLUTION INTRAMUSCULAR; INTRAVENOUS AS NEEDED
Status: DISCONTINUED | OUTPATIENT
Start: 2024-05-17 | End: 2024-05-17 | Stop reason: HOSPADM

## 2024-05-17 RX ADMIN — SODIUM CHLORIDE 100 ML/HR: 9 INJECTION, SOLUTION INTRAVENOUS at 07:57

## 2024-05-17 ASSESSMENT — COLUMBIA-SUICIDE SEVERITY RATING SCALE - C-SSRS
2. HAVE YOU ACTUALLY HAD ANY THOUGHTS OF KILLING YOURSELF?: NO
1. IN THE PAST MONTH, HAVE YOU WISHED YOU WERE DEAD OR WISHED YOU COULD GO TO SLEEP AND NOT WAKE UP?: NO
6. HAVE YOU EVER DONE ANYTHING, STARTED TO DO ANYTHING, OR PREPARED TO DO ANYTHING TO END YOUR LIFE?: NO

## 2024-05-17 ASSESSMENT — PAIN SCALES - GENERAL
PAINLEVEL_OUTOF10: 0 - NO PAIN

## 2024-05-17 ASSESSMENT — PAIN - FUNCTIONAL ASSESSMENT
PAIN_FUNCTIONAL_ASSESSMENT: 0-10
PAIN_FUNCTIONAL_ASSESSMENT: 0-10

## 2024-05-17 NOTE — DISCHARGE INSTRUCTIONS
You will not be allowed to drive right away after the procedure. Ask a family member or a friend to drive you home.  Do not operate heavy or dangerous machinery for at least 24 hours.  Do not make major decisions or sign important papers for at least 24 hours. You may not be thinking clearly.  Avoid beer, wine, or mixed drinks (alcohol) for at least 24 hours.

## 2024-05-17 NOTE — Clinical Note
Vessel(s): right PTA, right GAY and right dorsalis pedis artery. Injected with hand injections. Multiple views taken.

## 2024-05-17 NOTE — OP NOTE
Lower Extremity Angiogram (R) Operative Note     Date: 2024  OR Location: [facility] Cardiac Cath Lab    Name: Anton Miranda, : 1945, Age: 78 y.o., MRN: [id number], Sex: male    Diagnosis  Pre-op Diagnosis     * PAD (peripheral artery disease) (CMS-HCC) [I73.9]     * Intermittent claudication of right lower extremity due to atherosclerosis (CMS-HCC) [I70.211] Post-op Diagnosis     * PAD (peripheral artery disease) (CMS-HCC) [I73.9]     * Intermittent claudication of right lower extremity due to atherosclerosis (CMS-HCC) [I70.211]     Procedures  Aortogram via US guided left femoral access  Selective right lower extremity angiogram  Mynx closure of left femoral access site      Surgeons      * Tali Lowry - Primary    Resident/Fellow/Other Assistant:  Surgeons and Role:  * No surgeons found with a matching role *    Procedure Summary  Anesthesia: Moderate Sedation  ASA: ASA status not filed in the log.  Anesthesia Staff: No anesthesia staff entered.  Estimated Blood Loss: 5mL  Intra-op Medications:   Administrations occurring from 0748 to 0845 on 24:   Medication Name Total Dose   oxygen (O2) therapy 112.7 L   fentaNYL PF (Sublimaze) injection 75 mcg   midazolam (Versed) injection 2 mg   lidocaine (Xylocaine) 20 mg/mL (2 %) injection 5 mL   iohexol (OMNIPaque) 300 mg iodine/mL solution 40 mL   sodium chloride 0.9% infusion 63.33 mL         Intraprocedure I/O Totals       None           Specimen: No specimens collected     Staff:   Circulator: Yosef Marin RN  Scrub Person: RT Carlos         Drains and/or Catheters: * None in log *    Tourniquet Times:         Implants:     Findings:   High grade right common femoral artery stenosis  Short segment occlusion of right profunda femoris artery origin  Occluded right superficial femoral artery    Indications: Anton Miranda is an 78 y.o. male who is having surgery for PAD (peripheral artery disease) (CMS-HCC) [I73.9]  Intermittent claudication of  right lower extremity due to atherosclerosis (CMS-[facility]) [I70.211].     The patient was seen in the preoperative area. The risks, benefits, complications, treatment options, non-operative alternatives, expected recovery and outcomes were discussed with the patient. The possibilities of reaction to medication, pulmonary aspiration, injury to surrounding structures, bleeding, recurrent infection, the need for additional procedures, failure to diagnose a condition, and creating a complication requiring transfusion or operation were discussed with the patient. The patient concurred with the proposed plan, giving informed consent.  The site of surgery was properly noted/marked if necessary per policy. The patient has been actively warmed in preoperative area. Preoperative antibiotics are not indicated. Venous thrombosis prophylaxis are not indicated.    Procedure Details:   After the proper preoperative workup and informed consent was obtained, the patient was taken to the procedure room and laid in the supine position.  He was placed on hemodynamic monitoring and given moderate sedation in the form fentanyl Versed.  Bilateral groins were prepped and draped in sterile fashion.  The left femoral artery was visualized under duplex ultrasound.  It was noted to be patent with no evidence of any significant stenosis.  It was therefore accessed in the direct ultrasound visualization using a micropuncture needle.  Micropuncture sheath was placed via Seldinger technique.  This was immediately upsized to a 5 German sheath.  The sheath was flushed, and then an Omni Flush catheter was passed through the sheath and guided into the abdominal aorta.  The tip of the catheter was positioned at the level of L1.  Then a flush aortogram was performed in the AP projection.  This did reveal that the aorta was widely patent.  The tip of the catheter was then moved to the distal aorta and a pelvic oblique angiogram was performed in 25 degree BUSCH  projection.  This did reveal that bilateral iliac systems are widely patent.  The catheter was then advanced up and over the bifurcation and the tip positioned at the level of the right femoral head.  Then a selective right lower extremity angiogram was performed in a sequential fashion from the groin to the foot.  This did reveal a high-grade stenosis of the right common femoral artery.  There is also focal occlusion of the origin of the profunda femoris artery with distal reconstitution.  The superficial femoral artery was noted to be chronically occluded.  There was reconstitution of the popliteal artery just above the knee via large collaterals from the profunda femoris artery.  Below the knee there is a three-vessel runoff to the foot.  Given these findings, decision was made not to pro-form any endovascular intervention as patient would likely need a femoral endarterectomy.  Therefore at this time the procedure was completed.  All wires and catheters removed.  The sheath was removed and the access site was closed with a Mynx closure device and good hemostasis was achieved.  The patient tolerated the procedure well and there were no complications.  He was transferred to the recovery area in stable condition.    Complications:  None; patient tolerated the procedure well.    Disposition: PACU - hemodynamically stable.  Condition: stable         Additional Details: none    Attending Attestation: I performed the procedure.    Tali Lowry  Phone Number: [phone]

## 2024-05-17 NOTE — NURSING NOTE
0950:  BP 84/46 noted x 2. Asymptomatic. Pt did take home BP meds this am. MD notified    1030: HOB elevated 30 degrees    1105:  Pt ambulated to restroom, no bleeding or hematoma noted to L groin site.    1135: IV removed. Discharge instructions reviewed with patient and wife. Verbalized understanding. L groin site dressing clean, dry and intact. No signs/symptoms of bleeding or hematoma. VSS.     1145: Patient discharge via wheelchair by cath lab RN.

## 2024-05-17 NOTE — Clinical Note
The left DP pulse is detected w/ doppler. The right DP pulse is non-palpable. The PT pulses are detected w/ doppler bilaterally. The radial pulses are 1+ bilaterally.

## 2024-06-10 ENCOUNTER — HOSPITAL ENCOUNTER (OUTPATIENT)
Dept: VASCULAR MEDICINE | Facility: CLINIC | Age: 79
Discharge: HOME | End: 2024-06-10
Payer: MEDICARE

## 2024-06-10 DIAGNOSIS — I73.9 PAD (PERIPHERAL ARTERY DISEASE) (CMS-HCC): ICD-10-CM

## 2024-06-10 DIAGNOSIS — Z01.818 PRE-OPERATIVE EXAM: ICD-10-CM

## 2024-06-10 DIAGNOSIS — I70.211 ATHEROSCLEROSIS OF NATIVE ARTERY OF RIGHT LOWER EXTREMITY WITH INTERMITTENT CLAUDICATION (CMS-HCC): ICD-10-CM

## 2024-06-10 PROCEDURE — 93970 EXTREMITY STUDY: CPT

## 2024-06-10 PROCEDURE — 93970 EXTREMITY STUDY: CPT | Performed by: STUDENT IN AN ORGANIZED HEALTH CARE EDUCATION/TRAINING PROGRAM

## 2024-06-18 ENCOUNTER — APPOINTMENT (OUTPATIENT)
Dept: VASCULAR SURGERY | Facility: CLINIC | Age: 79
End: 2024-06-18
Payer: MEDICARE

## 2024-06-18 VITALS
WEIGHT: 154 LBS | HEART RATE: 77 BPM | SYSTOLIC BLOOD PRESSURE: 132 MMHG | BODY MASS INDEX: 24.75 KG/M2 | DIASTOLIC BLOOD PRESSURE: 76 MMHG | HEIGHT: 66 IN | OXYGEN SATURATION: 98 %

## 2024-06-18 DIAGNOSIS — I70.211 INTERMITTENT CLAUDICATION OF RIGHT LOWER EXTREMITY DUE TO ATHEROSCLEROSIS (CMS-HCC): ICD-10-CM

## 2024-06-18 DIAGNOSIS — Z98.890 HISTORY OF CAROTID ENDARTERECTOMY: ICD-10-CM

## 2024-06-18 DIAGNOSIS — I73.9 PAD (PERIPHERAL ARTERY DISEASE) (CMS-HCC): Primary | ICD-10-CM

## 2024-06-18 DIAGNOSIS — I65.21 STENOSIS OF RIGHT CAROTID ARTERY: ICD-10-CM

## 2024-06-18 PROBLEM — I65.29 CAROTID STENOSIS, ASYMPTOMATIC, UNSPECIFIED LATERALITY: Status: RESOLVED | Noted: 2024-06-18 | Resolved: 2024-06-18

## 2024-06-18 PROBLEM — I65.29 CAROTID STENOSIS, ASYMPTOMATIC, UNSPECIFIED LATERALITY: Status: ACTIVE | Noted: 2024-06-18

## 2024-06-18 PROCEDURE — 3075F SYST BP GE 130 - 139MM HG: CPT | Performed by: SURGERY

## 2024-06-18 PROCEDURE — 1157F ADVNC CARE PLAN IN RCRD: CPT | Performed by: SURGERY

## 2024-06-18 PROCEDURE — 1159F MED LIST DOCD IN RCRD: CPT | Performed by: SURGERY

## 2024-06-18 PROCEDURE — 1036F TOBACCO NON-USER: CPT | Performed by: SURGERY

## 2024-06-18 PROCEDURE — 3078F DIAST BP <80 MM HG: CPT | Performed by: SURGERY

## 2024-06-18 PROCEDURE — 99215 OFFICE O/P EST HI 40 MIN: CPT | Performed by: SURGERY

## 2024-06-18 PROCEDURE — 1160F RVW MEDS BY RX/DR IN RCRD: CPT | Performed by: SURGERY

## 2024-06-18 RX ORDER — CILOSTAZOL 100 MG/1
100 TABLET ORAL 2 TIMES DAILY
Qty: 60 TABLET | Refills: 11 | Status: SHIPPED | OUTPATIENT
Start: 2024-06-18 | End: 2025-06-18

## 2024-06-18 NOTE — PROGRESS NOTES
History Of Present Illness  Anton Miranda is a 78 y.o. male presenting for PAD follow-up.  He is status post recent diagnostic right leg angiogram.  This was done for claudication symptoms.  Findings did reveal severe stenosis of the distal common femoral artery into the profunda femoris and superficial femoral arteries.  There is also noted occlusion of the mid and distal SFA with reconstitution of the popliteal artery.  Patient states his claudication symptoms remain unchanged, and he is able to walk a quarter of a mile before having to stop and rest for a few minutes.  He denies any rest pain or tissue loss.  Vein mapping was done in preparation possible surgical intervention.  However this does reveal that the GSV and SSV on the right side are small and fibrotic.  He has had previous GSV harvest on the left for CABG, and the left SSV is also noted to be small and fibrotic.     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    Surgical History  He has a past surgical history that includes Other surgical history (02/06/2019); Other surgical history (02/06/2019); Other surgical history (02/06/2019); Other surgical history (05/17/2021); CT angio aorta and bilateral iliofemoral runoff w and or wo IV contrast (5/8/2023); and Invasive Vascular Procedure (Right, 5/17/2024).     Social History  He reports that he has quit smoking. His smoking use included cigarettes. He has never used smokeless tobacco. He reports that he does not  "currently use alcohol. He reports that he does not use drugs.    Family History  Family History   Problem Relation Name Age of Onset    Coronary artery disease Mother      Heart attack Mother          Allergies  Patient has no known allergies.    Review of Systems     Physical Exam  Vitals reviewed.   Constitutional:       General: He is not in acute distress.     Appearance: Normal appearance. He is normal weight.   HENT:      Head: Normocephalic and atraumatic.   Eyes:      Extraocular Movements: Extraocular movements intact.      Conjunctiva/sclera: Conjunctivae normal.   Cardiovascular:      Rate and Rhythm: Normal rate and regular rhythm.      Pulses:           Femoral pulses are 2+ on the right side and 2+ on the left side.       Dorsalis pedis pulses are detected w/ Doppler on the right side and 1+ on the left side.        Posterior tibial pulses are detected w/ Doppler on the right side and 1+ on the left side.      Heart sounds: Normal heart sounds.   Pulmonary:      Effort: Pulmonary effort is normal.      Breath sounds: Normal breath sounds.   Abdominal:      General: Abdomen is flat.      Palpations: Abdomen is soft.   Musculoskeletal:         General: No swelling or tenderness. Normal range of motion.      Cervical back: Normal range of motion.      Right lower leg: No edema.      Left lower leg: No edema.   Skin:     General: Skin is warm and dry.      Capillary Refill: Capillary refill takes less than 2 seconds.   Neurological:      General: No focal deficit present.      Mental Status: He is alert and oriented to person, place, and time.      Cranial Nerves: No cranial nerve deficit.      Sensory: No sensory deficit.      Motor: No weakness.   Psychiatric:         Mood and Affect: Mood normal.         Behavior: Behavior normal.          Last Recorded Vitals  Blood pressure 132/76, pulse 77, height 1.676 m (5' 6\"), weight 69.9 kg (154 lb), SpO2 98%.    Relevant Results      Current Outpatient " Medications:     amLODIPine (Norvasc) 5 mg tablet, Take 1 tablet (5 mg) by mouth once daily., Disp: 90 tablet, Rfl: 3    aspirin 81 mg EC tablet, Take 1 tablet (81 mg) by mouth once daily., Disp: , Rfl:     atorvastatin (Lipitor) 40 mg tablet, Take 1 tablet (40 mg) by mouth once daily., Disp: 90 tablet, Rfl: 3    cilostazol (Pletal) 100 mg tablet, Take 1 tablet (100 mg) by mouth 2 times a day., Disp: 60 tablet, Rfl: 11    losartan-hydrochlorothiazide (Hyzaar) 100-12.5 mg tablet, Take 1 tablet by mouth once daily., Disp: 90 tablet, Rfl: 3    metoprolol succinate XL (Toprol-XL) 25 mg 24 hr tablet, Take 1/2 Tablet (12.5 mg) Daily, Disp: 45 tablet, Rfl: 3    nitroglycerin (Nitrostat) 0.4 mg SL tablet, Place 1 tablet (0.4 mg) under the tongue every 5 minutes if needed. Up to 3 doses as needed for chest pain, Disp: , Rfl:     prasugrel (Effient) 10 mg tablet, Take 1 tablet (10 mg) by mouth once daily., Disp: 90 tablet, Rfl: 3       Vascular US lower extremity vein mapping bilateral    Result Date: 6/15/2024           [place] [address] Tel [phone] and Fax [phone]  Vascular Lab Report VASC US LOWER EXTREMITY VEIN MAPPING BILATERAL  Patient Name:      PASHA Dye Physician:  [id number] Nomi Acosta MD Study Date:        6/10/2024            Ordering Physician: [id number] RAYNE BECERRA MRN/PID:           [id number]             Technologist:       Ericka CONNELLY Accession#:        [id number]         Technologist 2: Date of Birth/Age: 1945 / 78 years Encounter#:         [id number] Gender:            M Admission Status:  Outpatient           Location Performed: [facility]  Diagnosis/ICD: Encounter for other preprocedural examination-Z01.818 CPT Codes:     01046 Vein mapping complete  CONCLUSIONS: Right Lower Vein  Mapping: Right lower extremity vein: The great saphenous vein and small saphenous vein show evidence of fibrosis. Left Lower Vein Mapping: The left great saphenous was not evaluated due to previously being harvested. Left lower extremity vein: The small saphenous vein shows evidence of fibrosis.  Imaging & Doppler Findings:  Right          Compress Thrombus  Diam SFJ              Yes      None   5.5 mm Prox Thigh GSV   Yes      None   1.6 mm Mid Thigh GSV     No    Fibrotic 1.6 mm Knee GSV         Yes      None   1.6 mm Prox Calf GSV     No    Fibrotic 0.7 mm Mid Calf GSV      No    Fibrotic 1.0 mm Dist Calf GSV    Yes      None   0.9 mm SSV Prox          No    Fibrotic 1.0 mm SSV Mid           No    Fibrotic 1.0 mm SSV Distal        No    Fibrotic 0.7 mm  Left           Compress Thrombus  Diam SFJ              Yes      None   4.2 mm Prox Thigh GSV   Yes      None   2.2 mm SSV Prox          No    Fibrotic 1.4 mm SSV Mid           No    Fibrotic 1.0 mm SSV Distal        No    Fibrotic 1.0 mm  [id number] Nomi Acosta MD Electronically signed by [id number] Nomi Acosta MD on 6/15/2024 at 2:15:07 PM  ** Final **          Assessment/Plan   Diagnoses and all orders for this visit:  PAD (peripheral artery disease) (CMS-[facility])  -     cilostazol (Pletal) 100 mg tablet; Take 1 tablet (100 mg) by mouth 2 times a day.  -     Vascular US Ankle Brachial Index (NEFTALI) Without Exercise; Future  Intermittent claudication of right lower extremity due to atherosclerosis (CMS-[facility])  -     cilostazol (Pletal) 100 mg tablet; Take 1 tablet (100 mg) by mouth 2 times a day.  -     Vascular US Ankle Brachial Index (NEFTALI) Without Exercise; Future  History of carotid endarterectomy  Stenosis of right carotid artery  -     Vascular US Carotid Artery Duplex Bilateral; Future      77yo male with PAD and claudication symptoms.  Recent right leg angiogram reveals multilevel disease with high-grade stenosis of the distal common femoral  artery extending into the bifurcation.  There is also noted occlusion of the mid to distal SFA with reconstitution of the popliteal artery.  We have discussed options for intervention including femoral endarterectomy emerald endarterectomy with endovascular intervention of the SFA, and femoral endarterectomy with femoropopliteal bypass.  However at this time his symptoms are limited to claudication a quarter of a mile.  While this does bother him he denies any debilitating symptoms.  He denies any rest pain or ulcers.  I have discussed with him that if any intervention is to be done he did limited to femoral endarterectomy and possibly endovascular intervention of the SFA.  However I would not offer him a bypass procedure at this time given his claudication symptoms are rather limited.  I have also discussed with him the possibility of continuing medical management for the time and resurvey ED intervention until absolutely needed.  He is agreeable to continue with medical management for now.  We will add cilostazol to his medication regimen to hopefully his claudication symptoms.  He is to follow-up in 3 months to reassess.  We will repeat NEFTALI/PVR study at that time.    Patient also has known history of right carotid endarterectomy.  He has not had any follow-up for this in several years.  He currently denies any lateralizing symptoms and no focal deficits are noted on exam.  However given the history I have recommended obtaining a carotid duplex at this time for surveillance.  Will discuss results at his 3-month follow-up, or sooner if needed.           Tali Lowry MD

## 2024-07-16 ENCOUNTER — HOSPITAL ENCOUNTER (OUTPATIENT)
Dept: VASCULAR MEDICINE | Facility: CLINIC | Age: 79
Discharge: HOME | End: 2024-07-16
Payer: MEDICARE

## 2024-07-16 DIAGNOSIS — I65.21 STENOSIS OF RIGHT CAROTID ARTERY: ICD-10-CM

## 2024-07-16 DIAGNOSIS — I65.23 OCCLUSION AND STENOSIS OF BILATERAL CAROTID ARTERIES: ICD-10-CM

## 2024-07-16 PROCEDURE — 93880 EXTRACRANIAL BILAT STUDY: CPT

## 2024-07-16 PROCEDURE — 93880 EXTRACRANIAL BILAT STUDY: CPT | Performed by: INTERNAL MEDICINE

## 2024-09-03 ENCOUNTER — HOSPITAL ENCOUNTER (OUTPATIENT)
Dept: VASCULAR MEDICINE | Facility: CLINIC | Age: 79
Discharge: HOME | End: 2024-09-03
Payer: MEDICARE

## 2024-09-03 DIAGNOSIS — I70.211 INTERMITTENT CLAUDICATION OF RIGHT LOWER EXTREMITY DUE TO ATHEROSCLEROSIS (CMS-HCC): ICD-10-CM

## 2024-09-03 DIAGNOSIS — I73.9 PAD (PERIPHERAL ARTERY DISEASE) (CMS-HCC): ICD-10-CM

## 2024-09-03 PROCEDURE — 93922 UPR/L XTREMITY ART 2 LEVELS: CPT

## 2024-09-03 PROCEDURE — 93922 UPR/L XTREMITY ART 2 LEVELS: CPT | Performed by: INTERNAL MEDICINE

## 2024-09-17 ENCOUNTER — APPOINTMENT (OUTPATIENT)
Dept: VASCULAR SURGERY | Facility: CLINIC | Age: 79
End: 2024-09-17
Payer: MEDICARE

## 2024-09-24 ENCOUNTER — OFFICE VISIT (OUTPATIENT)
Dept: VASCULAR SURGERY | Facility: CLINIC | Age: 79
End: 2024-09-24
Payer: MEDICARE

## 2024-09-24 VITALS
OXYGEN SATURATION: 100 % | HEART RATE: 62 BPM | HEIGHT: 66 IN | DIASTOLIC BLOOD PRESSURE: 70 MMHG | BODY MASS INDEX: 24.86 KG/M2 | SYSTOLIC BLOOD PRESSURE: 124 MMHG

## 2024-09-24 DIAGNOSIS — I73.9 PAD (PERIPHERAL ARTERY DISEASE) (CMS-HCC): ICD-10-CM

## 2024-09-24 PROCEDURE — 1157F ADVNC CARE PLAN IN RCRD: CPT | Performed by: SURGERY

## 2024-09-24 PROCEDURE — 1036F TOBACCO NON-USER: CPT | Performed by: SURGERY

## 2024-09-24 PROCEDURE — 99214 OFFICE O/P EST MOD 30 MIN: CPT | Performed by: SURGERY

## 2024-09-24 PROCEDURE — 3078F DIAST BP <80 MM HG: CPT | Performed by: SURGERY

## 2024-09-24 PROCEDURE — 3074F SYST BP LT 130 MM HG: CPT | Performed by: SURGERY

## 2024-09-24 PROCEDURE — 1159F MED LIST DOCD IN RCRD: CPT | Performed by: SURGERY

## 2024-09-24 PROCEDURE — 1160F RVW MEDS BY RX/DR IN RCRD: CPT | Performed by: SURGERY

## 2024-09-24 NOTE — PROGRESS NOTES
History Of Present Illness  Anton Miranda is a 79 y.o. male presenting for PAD follow-up.  His last visit to the office was 3 months ago.  He states he has been doing well since that time.  He walks daily in the park [place] and states that he can walk almost a mile before having to stop and rest.  He denies any rest pain or sores on the feet.  He continues to take aspirin, atorvastatin, and cilostazol daily.  He does note some change in his bowel habits since he has been taking the cilostazol.    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    Surgical History  He has a past surgical history that includes Other surgical history (02/06/2019); Other surgical history (02/06/2019); Other surgical history (02/06/2019); Other surgical history (05/17/2021); CT angio aorta and bilateral iliofemoral runoff w and or wo IV contrast (5/8/2023); and Invasive Vascular Procedure (Right, 5/17/2024).     Social History  He reports that he has quit smoking. His smoking use included cigarettes. He has never used smokeless tobacco. He reports that he does not currently use alcohol. He reports that he does not use drugs.    Family History  Family History   Problem Relation Name Age of Onset    Coronary artery disease Mother      Heart attack Mother          Allergies  Patient has no known allergies.    Review of Systems     Physical Exam  Vitals reviewed.   Constitutional:       General: He is not in acute distress.     Appearance: Normal  "appearance. He is normal weight.   HENT:      Head: Normocephalic and atraumatic.   Eyes:      Extraocular Movements: Extraocular movements intact.      Conjunctiva/sclera: Conjunctivae normal.   Cardiovascular:      Rate and Rhythm: Normal rate and regular rhythm.      Pulses:           Femoral pulses are 2+ on the right side and 2+ on the left side.       Dorsalis pedis pulses are detected w/ Doppler on the right side and 1+ on the left side.        Posterior tibial pulses are detected w/ Doppler on the right side and 1+ on the left side.      Heart sounds: Normal heart sounds.   Pulmonary:      Effort: Pulmonary effort is normal.      Breath sounds: Normal breath sounds.   Abdominal:      General: Abdomen is flat.      Palpations: Abdomen is soft.   Musculoskeletal:         General: No swelling or tenderness. Normal range of motion.      Cervical back: Normal range of motion.      Right lower leg: No edema.      Left lower leg: No edema.   Skin:     General: Skin is warm and dry.      Capillary Refill: Capillary refill takes less than 2 seconds.   Neurological:      General: No focal deficit present.      Mental Status: He is alert and oriented to person, place, and time.      Cranial Nerves: No cranial nerve deficit.      Sensory: No sensory deficit.      Motor: No weakness.   Psychiatric:         Mood and Affect: Mood normal.         Behavior: Behavior normal.          Last Recorded Vitals  Blood pressure 124/70, pulse 62, height 1.676 m (5' 6\"), SpO2 100%.    Relevant Results      Current Outpatient Medications:     amLODIPine (Norvasc) 5 mg tablet, Take 1 tablet (5 mg) by mouth once daily., Disp: 90 tablet, Rfl: 3    aspirin 81 mg EC tablet, Take 1 tablet (81 mg) by mouth once daily., Disp: , Rfl:     atorvastatin (Lipitor) 40 mg tablet, Take 1 tablet (40 mg) by mouth once daily., Disp: 90 tablet, Rfl: 3    cilostazol (Pletal) 100 mg tablet, Take 1 tablet (100 mg) by mouth 2 times a day., Disp: 60 tablet, " Rfl: 11    losartan-hydrochlorothiazide (Hyzaar) 100-12.5 mg tablet, Take 1 tablet by mouth once daily., Disp: 90 tablet, Rfl: 3    metoprolol succinate XL (Toprol-XL) 25 mg 24 hr tablet, Take 1/2 Tablet (12.5 mg) Daily, Disp: 45 tablet, Rfl: 3    nitroglycerin (Nitrostat) 0.4 mg SL tablet, Place 1 tablet (0.4 mg) under the tongue every 5 minutes if needed. Up to 3 doses as needed for chest pain, Disp: , Rfl:     prasugrel (Effient) 10 mg tablet, Take 1 tablet (10 mg) by mouth once daily., Disp: 90 tablet, Rfl: 3       Vascular US Ankle Brachial Index (NEFTALI) Without Exercise    Result Date: 9/6/2024           [place] [address] Tel [phone] and Fax [phone]  Vascular Lab Report VASC US ANKLE BRACHIAL INDEX (NEFTALI) WITHOUT EXERCISE  Patient Name:     PASHA Dye Physician: [id number] Alyssa Flanagan MD Study Date:       9/3/2024            Ordering           [id number] RAYNE ESCOTO                                       Physician:         MARIAM MRN/PID:          [id number]            Technologist:      Ericka CONNELLY Accession#:       [id number]        Technologist 2: Date of           1945 / 79      Encounter#:        [id number] Birth/Age:        years Gender:           M Admission Status: Outpatient          Location           [facility]                                       Performed:  Diagnosis/ICD: Peripheral vascular disease, unspecified-I73.9 Indication:    Peripheral vascular disease CPT Codes:     83481 Peripheral artery NEFTALI Only  CONCLUSIONS: Right Lower PVR: Evidence of moderate arterial occlusive disease in the right lower extremity at rest. Decreased digital perfusion noted. Monophasic flow is noted in the right posterior tibial artery and right dorsalis pedis artery. Multiphasic flow is noted in the right common femoral artery. Left Lower PVR: Evidence of mild arterial occlusive  disease in the left lower extremity at rest. Decreased digital perfusion noted. Multiphasic flow is noted in the left common femoral artery, left posterior tibial artery and left dorsalis pedis artery.  Imaging & Doppler Findings:  RIGHT Lower PVR                Pressures Ratios Right Posterior Tibial (Ankle) 57 mmHg   0.44 Right Dorsalis Pedis (Ankle)   62 mmHg   0.48 Right Digit (Great Toe)        47 mmHg   0.36   LEFT Lower PVR                Pressures Ratios Left Posterior Tibial (Ankle) 85 mmHg   0.66 Left Dorsalis Pedis (Ankle)   91 mmHg   0.71 Left Digit (Great Toe)        70 mmHg   0.54                     Right     Left Brachial Pressure 129 mmHg 125 mmHg   [id number] Alyssa Flanagan MD Electronically signed by [id number] Alyssa Flanagan MD on 9/6/2024 at 9:20:53 AM  ** Final **            Assessment/Plan   Diagnoses and all orders for this visit:  PAD (peripheral artery disease) (CMS-[facility])  -     NEFTALI without exercise; Future      78yo male with PAD. He has stable claudication symptoms and is able to walk nearly a mile before having to stop and rest. I have recommended that he continue medical management with aspirin and atorvastatin. Will decrease his cilostazol dose by half to hopefully help reduce his GI side effect. He is to follow up in 6 months with repeat NEFTALI study.           Tali Lowry MD

## 2025-04-01 DIAGNOSIS — I70.211 INTERMITTENT CLAUDICATION OF RIGHT LOWER EXTREMITY DUE TO ATHEROSCLEROSIS: ICD-10-CM

## 2025-04-01 DIAGNOSIS — I73.9 PAD (PERIPHERAL ARTERY DISEASE) (CMS-HCC): ICD-10-CM

## 2025-04-01 RX ORDER — CILOSTAZOL 100 MG/1
100 TABLET ORAL 2 TIMES DAILY
Qty: 180 TABLET | Refills: 3 | Status: SHIPPED | OUTPATIENT
Start: 2025-04-01

## 2025-04-09 PROBLEM — I48.19 PERSISTENT ATRIAL FIBRILLATION (MULTI): Status: ACTIVE | Noted: 2025-04-09

## 2025-04-09 PROBLEM — I21.9 MYOCARDIAL INFARCTION (MULTI): Status: ACTIVE | Noted: 2025-04-09

## 2025-04-09 PROBLEM — R00.1 SINUS BRADYCARDIA: Status: ACTIVE | Noted: 2025-04-09

## 2025-04-15 ENCOUNTER — APPOINTMENT (OUTPATIENT)
Dept: VASCULAR SURGERY | Facility: CLINIC | Age: 80
End: 2025-04-15
Payer: MEDICARE

## 2025-05-01 ENCOUNTER — APPOINTMENT (OUTPATIENT)
Dept: CARDIOLOGY | Facility: CLINIC | Age: 80
End: 2025-05-01
Payer: MEDICARE

## 2025-05-01 ENCOUNTER — HOSPITAL ENCOUNTER (OUTPATIENT)
Dept: CARDIOLOGY | Facility: CLINIC | Age: 80
Discharge: HOME | End: 2025-05-01
Payer: MEDICARE

## 2025-05-01 VITALS — BODY MASS INDEX: 24.86 KG/M2 | HEIGHT: 66 IN

## 2025-05-01 VITALS
WEIGHT: 151 LBS | OXYGEN SATURATION: 98 % | BODY MASS INDEX: 24.37 KG/M2 | DIASTOLIC BLOOD PRESSURE: 72 MMHG | SYSTOLIC BLOOD PRESSURE: 110 MMHG | HEART RATE: 55 BPM

## 2025-05-01 DIAGNOSIS — I25.118 CORONARY ARTERY DISEASE WITH EXERTIONAL ANGINA: ICD-10-CM

## 2025-05-01 DIAGNOSIS — I73.9 PAD (PERIPHERAL ARTERY DISEASE) (CMS-HCC): ICD-10-CM

## 2025-05-01 DIAGNOSIS — I35.0 MODERATE AORTIC STENOSIS: ICD-10-CM

## 2025-05-01 DIAGNOSIS — I10 PRIMARY HYPERTENSION: ICD-10-CM

## 2025-05-01 DIAGNOSIS — Z01.810 PREOPERATIVE CARDIOVASCULAR EXAMINATION: ICD-10-CM

## 2025-05-01 DIAGNOSIS — I35.0 AORTIC VALVE STENOSIS, ETIOLOGY OF CARDIAC VALVE DISEASE UNSPECIFIED: Primary | ICD-10-CM

## 2025-05-01 DIAGNOSIS — R00.1 SINUS BRADYCARDIA: ICD-10-CM

## 2025-05-01 DIAGNOSIS — I70.211 INTERMITTENT CLAUDICATION OF RIGHT LOWER EXTREMITY DUE TO ATHEROSCLEROSIS: ICD-10-CM

## 2025-05-01 DIAGNOSIS — I10 HYPERTENSION, UNSPECIFIED TYPE: ICD-10-CM

## 2025-05-01 DIAGNOSIS — Z95.1 H/O FOUR VESSEL CORONARY ARTERY BYPASS GRAFT: ICD-10-CM

## 2025-05-01 DIAGNOSIS — E78.5 HYPERLIPIDEMIA, UNSPECIFIED HYPERLIPIDEMIA TYPE: ICD-10-CM

## 2025-05-01 PROBLEM — I48.19 PERSISTENT ATRIAL FIBRILLATION (MULTI): Status: RESOLVED | Noted: 2025-04-09 | Resolved: 2025-05-01

## 2025-05-01 PROBLEM — I21.9 MYOCARDIAL INFARCTION (MULTI): Status: RESOLVED | Noted: 2025-04-09 | Resolved: 2025-05-01

## 2025-05-01 LAB
ATRIAL RATE: 55 BPM
P AXIS: 69 DEGREES
P OFFSET: 166 MS
P ONSET: 120 MS
PR INTERVAL: 174 MS
Q ONSET: 207 MS
QRS COUNT: 9 BEATS
QRS DURATION: 110 MS
QT INTERVAL: 432 MS
QTC CALCULATION(BAZETT): 413 MS
QTC FREDERICIA: 419 MS
R AXIS: -10 DEGREES
T AXIS: 13 DEGREES
T OFFSET: 423 MS
VENTRICULAR RATE: 55 BPM

## 2025-05-01 PROCEDURE — 99213 OFFICE O/P EST LOW 20 MIN: CPT | Mod: 25 | Performed by: INTERNAL MEDICINE

## 2025-05-01 PROCEDURE — 3078F DIAST BP <80 MM HG: CPT | Performed by: INTERNAL MEDICINE

## 2025-05-01 PROCEDURE — 93306 TTE W/DOPPLER COMPLETE: CPT | Performed by: INTERNAL MEDICINE

## 2025-05-01 PROCEDURE — 99214 OFFICE O/P EST MOD 30 MIN: CPT | Performed by: INTERNAL MEDICINE

## 2025-05-01 PROCEDURE — 1157F ADVNC CARE PLAN IN RCRD: CPT | Performed by: INTERNAL MEDICINE

## 2025-05-01 PROCEDURE — 1159F MED LIST DOCD IN RCRD: CPT | Performed by: INTERNAL MEDICINE

## 2025-05-01 PROCEDURE — 93005 ELECTROCARDIOGRAM TRACING: CPT | Performed by: INTERNAL MEDICINE

## 2025-05-01 PROCEDURE — 93306 TTE W/DOPPLER COMPLETE: CPT

## 2025-05-01 PROCEDURE — 3074F SYST BP LT 130 MM HG: CPT | Performed by: INTERNAL MEDICINE

## 2025-05-01 RX ORDER — AMLODIPINE BESYLATE 5 MG/1
5 TABLET ORAL DAILY
Qty: 90 TABLET | Refills: 3 | Status: SHIPPED | OUTPATIENT
Start: 2025-05-01

## 2025-05-01 RX ORDER — ATORVASTATIN CALCIUM 40 MG/1
40 TABLET, FILM COATED ORAL DAILY
Qty: 90 TABLET | Refills: 3 | Status: SHIPPED | OUTPATIENT
Start: 2025-05-01 | End: 2025-05-01 | Stop reason: SDUPTHER

## 2025-05-01 RX ORDER — METOPROLOL SUCCINATE 25 MG/1
TABLET, EXTENDED RELEASE ORAL
Qty: 45 TABLET | Refills: 3 | Status: SHIPPED | OUTPATIENT
Start: 2025-05-01 | End: 2025-05-01 | Stop reason: SDUPTHER

## 2025-05-01 RX ORDER — METOPROLOL SUCCINATE 25 MG/1
TABLET, EXTENDED RELEASE ORAL
Qty: 45 TABLET | Refills: 3 | Status: SHIPPED | OUTPATIENT
Start: 2025-05-01

## 2025-05-01 RX ORDER — CILOSTAZOL 100 MG/1
100 TABLET ORAL 2 TIMES DAILY
Qty: 180 TABLET | Refills: 3 | Status: SHIPPED | OUTPATIENT
Start: 2025-05-01

## 2025-05-01 RX ORDER — ATORVASTATIN CALCIUM 40 MG/1
40 TABLET, FILM COATED ORAL DAILY
Qty: 90 TABLET | Refills: 3 | Status: SHIPPED | OUTPATIENT
Start: 2025-05-01

## 2025-05-01 RX ORDER — LOSARTAN POTASSIUM AND HYDROCHLOROTHIAZIDE 12.5; 1 MG/1; MG/1
1 TABLET ORAL DAILY
Qty: 90 TABLET | Refills: 3 | Status: SHIPPED | OUTPATIENT
Start: 2025-05-01 | End: 2025-05-01 | Stop reason: SDUPTHER

## 2025-05-01 RX ORDER — LOSARTAN POTASSIUM AND HYDROCHLOROTHIAZIDE 12.5; 1 MG/1; MG/1
1 TABLET ORAL DAILY
Qty: 90 TABLET | Refills: 3 | Status: SHIPPED | OUTPATIENT
Start: 2025-05-01

## 2025-05-01 RX ORDER — CILOSTAZOL 100 MG/1
100 TABLET ORAL 2 TIMES DAILY
Qty: 180 TABLET | Refills: 3 | Status: SHIPPED | OUTPATIENT
Start: 2025-05-01 | End: 2025-05-01 | Stop reason: SDUPTHER

## 2025-05-01 RX ORDER — AMLODIPINE BESYLATE 5 MG/1
5 TABLET ORAL DAILY
Qty: 90 TABLET | Refills: 3 | Status: SHIPPED | OUTPATIENT
Start: 2025-05-01 | End: 2025-05-01 | Stop reason: SDUPTHER

## 2025-05-01 NOTE — PATIENT INSTRUCTIONS
Your echocardiogram shows no change in the degree of aortic stenosis.    Stop the Prasugrel 10 mg daily    Continue your other medications.

## 2025-05-01 NOTE — PROGRESS NOTES
Subjective   Anton Miranda is a 79 y.o. male.    Chief Complaint:  Follow-up coronary artery disease, aortic stenosis, claudication.  Exertional dyspnea.    HPI    His symptoms with respect to his coronary artery disease has remained stable.  He spent most of the winter in [place] but has sold his home in [place] as his wife has developed more orthopedic issues.  [place] was at the [place] in [place].  Tries to be active.  Does try to do quite a bit of walking.  No typical anginal symptoms with activities.  Has had no syncopal or near syncopal events.  Does have mild claudication symptoms.    He presented on May 4, 2022 with severe hypertension. This appeared to be triggered by an anxiety attack. After initiation of Xanax therapy his blood pressures normalized.     Cardiac catheterization on 2021 showed an occluded left main. There was a patent left internal mammary graft to the anterior descending. There was a patent vein graft to the diagonal. There was a tight stenosis in the vein graft to the obtuse marginal branch. This was treated with stent placement. The right coronary artery was widely patent with minimal disease.     He underwent coronary artery bypass grafting times 4 in  at [facility]. In  he had stents placed and subsequently developed restenosis along with left main coronary artery disease. It is of interest to note that despite his 80% left main stenosis he had mild symptoms.      He has a past history of hypertension. He had a brief smoking history but quit in . His father had a myocardial infarction and  at age 66. He does have hyperlipidemia.     Is otherwise in good health with no major medical problems.      Allergies  Medication    · No Known Drug Allergies   Recorded By: Bela Collins; 2019 1:52:40 PM     Family History  Mother    · Family history of coronary artery disease (V17.3) (Z82.49)   · Family history of myocardial infarction (V17.3) (Z82.49)     Social  History  Problems    · Does not use illicit drugs (V49.89) (Z78.9)   · Former smoker (V15.82) (Z87.891)   · quit 1980   · Moderate alcohol use     Review of Systems   Constitutional: Positive for malaise/fatigue.   Cardiovascular:  Positive for claudication and dyspnea on exertion.   Musculoskeletal:  Positive for arthritis.       Current Medications[1]     Visit Vitals  /72   Pulse 55   Wt 68.5 kg (151 lb)   SpO2 98%   BMI 24.37 kg/m²   Smoking Status Former   BSA 1.79 m²        Objective     Constitutional:       Appearance: Not in distress.   Neck:      Vascular: JVD normal.   Pulmonary:      Breath sounds: Normal breath sounds.   Cardiovascular:      Normal rate. Regular rhythm. distant S1. distant S2.       Murmurs: There is a grade 4/6 systolic murmur.      No gallop.    Pulses:     Intact distal pulses.   Edema:     Peripheral edema absent.   Abdominal:      General: Bowel sounds are normal.   Neurological:      Mental Status: Alert and oriented to person, place and time.         Lab Review:   Lab Results   Component Value Date     05/06/2024    K 4.1 05/06/2024     05/06/2024    CO2 30 05/06/2024    BUN 15 05/06/2024    CREATININE 1.15 05/06/2024    GLUCOSE 98 05/06/2024    CALCIUM 9.4 05/06/2024     Lab Results   Component Value Date    CHOL 147 05/06/2024    TRIG 89 05/06/2024    HDL 55.7 05/06/2024       Assessment:    1.  Coronary disease.  Status postcoronary bypass grafting in 2000.  His last cardiac catheterization was in 2021.  Currently has no significant anginal symptoms we will continue medical management.  Will plan to treat with single antiplatelet therapy.    2.  Aortic stenosis.  Moderate aortic stenosis.  However today's echocardiographic study shows no significant progression of his aortic stenosis.  Will continue observation.  Does not reach criteria for intervention on his aortic valve.    3.  Hyperlipidemia.  Cholesterol 147, HDL 56, LDL 74.    4.  Hypertension.  Blood  pressures are well-controlled.    5.  Claudication.  Mild symptoms.  Followed by the vascular surgery service.    There is a note in the chart about the presence of atrial fibrillation.  However we cannot find any documentation of this arrhythmia.  Over the past several years since we have been seeing him he has not demonstrated any atrial fibrillation.         [1]   Current Outpatient Medications   Medication Sig Dispense Refill    aspirin 81 mg EC tablet Take 1 tablet (81 mg) by mouth once daily.      nitroglycerin (Nitrostat) 0.4 mg SL tablet Place 1 tablet (0.4 mg) under the tongue every 5 minutes if needed. Up to 3 doses as needed for chest pain      amLODIPine (Norvasc) 5 mg tablet Take 1 tablet (5 mg) by mouth once daily. 90 tablet 3    atorvastatin (Lipitor) 40 mg tablet Take 1 tablet (40 mg) by mouth once daily. 90 tablet 3    cilostazol (Pletal) 100 mg tablet Take 1 tablet (100 mg) by mouth 2 times a day. 180 tablet 3    losartan-hydrochlorothiazide (Hyzaar) 100-12.5 mg tablet Take 1 tablet by mouth once daily. 90 tablet 3    metoprolol succinate XL (Toprol-XL) 25 mg 24 hr tablet Take 1/2 Tablet (12.5 mg) Daily 45 tablet 3     No current facility-administered medications for this visit.

## 2025-05-02 LAB
AORTIC VALVE MEAN GRADIENT: 23 MMHG
AORTIC VALVE PEAK VELOCITY: 3.19 M/S
AV PEAK GRADIENT: 41 MMHG
AVA (PEAK VEL): 0.79 CM2
AVA (VTI): 0.88 CM2
EJECTION FRACTION APICAL 4 CHAMBER: 56.2
EJECTION FRACTION: 63 %
LEFT VENTRICLE INTERNAL DIMENSION DIASTOLE: 4.42 CM (ref 3.5–6)
LEFT VENTRICULAR OUTFLOW TRACT DIAMETER: 2.05 CM
MITRAL VALVE E/A RATIO: 0.92
RIGHT VENTRICLE FREE WALL PEAK S': 15 CM/S
TRICUSPID ANNULAR PLANE SYSTOLIC EXCURSION: 2.3 CM

## 2025-07-06 LAB
ATRIAL RATE: 55 BPM
P AXIS: 69 DEGREES
P OFFSET: 166 MS
P ONSET: 120 MS
PR INTERVAL: 174 MS
Q ONSET: 207 MS
QRS COUNT: 9 BEATS
QRS DURATION: 110 MS
QT INTERVAL: 432 MS
QTC CALCULATION(BAZETT): 413 MS
QTC FREDERICIA: 419 MS
R AXIS: -10 DEGREES
T AXIS: 13 DEGREES
T OFFSET: 423 MS
VENTRICULAR RATE: 55 BPM

## (undated) DEVICE — SHEATH, PINNACLE, W/.038 GW 10CM, 5FR INTRODUCER, 2.5 CM DIALATOR

## (undated) DEVICE — CLOSURE DEVICE, VASCULAR, MYNX CONTROL, 5FR

## (undated) DEVICE — GUIDEWIRE, BENTSON, COATED, 0.035 IN X 180 CM

## (undated) DEVICE — ACCESS KIT, MINI MAK, 4FR X 10CML, 0.018 X 40CM, SS/SS, ECHO ENHANCED 7CM NDL

## (undated) DEVICE — STOPCOCK, HIGH PRESSURE, FLO-SWITCH

## (undated) DEVICE — TORQUE DEVICE, ACCOMODATES WIRES W/DIA .010 TO .038"."

## (undated) DEVICE — Device

## (undated) DEVICE — CATHETER, ANGIO, SOFT-VU, OMNI FLUSH, 0.035 IN, 4 FR X 65 CM